# Patient Record
Sex: FEMALE | Race: WHITE | Employment: FULL TIME | URBAN - METROPOLITAN AREA
[De-identification: names, ages, dates, MRNs, and addresses within clinical notes are randomized per-mention and may not be internally consistent; named-entity substitution may affect disease eponyms.]

---

## 2017-02-09 ENCOUNTER — OFFICE VISIT (OUTPATIENT)
Dept: FAMILY MEDICINE | Facility: CLINIC | Age: 42
End: 2017-02-09
Payer: COMMERCIAL

## 2017-02-09 VITALS
TEMPERATURE: 98.3 F | OXYGEN SATURATION: 100 % | HEART RATE: 63 BPM | WEIGHT: 110 LBS | DIASTOLIC BLOOD PRESSURE: 60 MMHG | SYSTOLIC BLOOD PRESSURE: 102 MMHG | HEIGHT: 61 IN | BODY MASS INDEX: 20.77 KG/M2 | RESPIRATION RATE: 14 BRPM

## 2017-02-09 DIAGNOSIS — L03.011 CELLULITIS OF FINGER OF RIGHT HAND: Primary | ICD-10-CM

## 2017-02-09 PROCEDURE — 99213 OFFICE O/P EST LOW 20 MIN: CPT | Performed by: PHYSICIAN ASSISTANT

## 2017-02-09 RX ORDER — SULFAMETHOXAZOLE/TRIMETHOPRIM 800-160 MG
1 TABLET ORAL 2 TIMES DAILY
Qty: 20 TABLET | Refills: 0 | Status: SHIPPED | OUTPATIENT
Start: 2017-02-09 | End: 2021-06-21

## 2017-02-09 NOTE — PROGRESS NOTES
SUBJECTIVE:                                                    Elisa Hawkins is a 41 year old female who presents to clinic today for the following health issues:    Cellulitis      Duration: X2 weeks    Description (location/character/radiation): Right middle finger    Intensity:  moderate    Accompanying signs and symptoms: Red, peeling, painful, drainage    History (similar episodes/previous evaluation): None    Precipitating or alleviating factors: None    Therapies tried and outcome: Antibiotic cream Hydrogen Peroxide     She was trimming her nail and noted increased redness, swelling and has had some purulent drainage. She treated with hydrogen peroxide and antibiotic cream with no improvement. She has not lost her nail.     She is right handed and does type a lot for her job. She has noted pain with that and slowness in typing.     Problem list and histories reviewed & adjusted, as indicated.  Additional history: as documented    Patient Active Problem List   Diagnosis     Hyperlipidemia LDL goal <160     Past Surgical History   Procedure Laterality Date     Ent surgery  1999     wisdom teeth     Eye surgery  2008     lasik        Social History   Substance Use Topics     Smoking status: Never Smoker      Smokeless tobacco: Never Used     Alcohol Use: No     Family History   Problem Relation Age of Onset     Hypertension Mother      Musculoskeletal Disorder Father      gout         Current Outpatient Prescriptions   Medication Sig Dispense Refill     sulfamethoxazole-trimethoprim (BACTRIM DS/SEPTRA DS) 800-160 MG per tablet Take 1 tablet by mouth 2 times daily 20 tablet 0     hydrocortisone (ANUSOL-HC) 2.5 % rectal cream Place rectally 2 times daily 70 g 1     Prenatal Vit-Fe Fumarate-FA (PRENATAL MULTIVITAMIN  PLUS IRON) 27-0.8 MG TABS Take 1 tablet by mouth daily 100 tablet 3     [DISCONTINUED] mometasone (ELOCON) 0.1 % ointment Apply sparingly to affected area twice daily.  Do not apply to face. 15 g 1  "      ROS:  Patient denies fever, chills, nausea, vomiting, diarrhea, abdominal pain, chest pain, shortness of breath, headache, dizziness, lightheadedness. She denies numbness, tingling of the extremity.     OBJECTIVE:                                                    /60 mmHg  Pulse 63  Temp(Src) 98.3  F (36.8  C) (Tympanic)  Resp 14  Ht 5' 1\" (1.549 m)  Wt 110 lb (49.896 kg)  BMI 20.80 kg/m2  SpO2 100%  LMP 01/13/2017  Breastfeeding? No  Body mass index is 20.8 kg/(m^2).  GENERAL APPEARANCE: healthy, alert and no distress  ORTHO: Hand/Finger Exam: Inspection:Long Finger:  Redness and swelling of the distal phalanx with small amount of purulent discharge on the lateral aspect of the nailbed. Nail is intact.   Tender: distal phalanx  Range of Motion full in the right 3rd finger- extensor and flexor tendons are intact  Strength: not tested  Special tests: none  NEURO: SILT over the 3rd finger  VASC: cap refill <2 secs over distal 3rd finger    Diagnostic Test Results:  none      ASSESSMENT/PLAN:                                                        ICD-10-CM    1. Cellulitis of finger of right hand L03.011 sulfamethoxazole-trimethoprim (BACTRIM DS/SEPTRA DS) 800-160 MG per tablet     Discussed worsening symptoms including increased redness, swelling or systemic symptoms. She will notify us if things are not improving as anticipated. I have instructed her to aki tape the finger to reduce movement, pad the finger with guaze to protect it from further injury. This was done at clinic today. She will contact us with questions/concerns.     Nicole Joy Siegler, PA-C  [facility]  "

## 2017-02-09 NOTE — MR AVS SNAPSHOT
"              After Visit Summary   2017    Elisa Hawkins    MRN: [id number]           Patient Information     Date Of Birth          1975        Visit Information        Provider Department      2017 7:30 AM Siegler, Nicole Joy, PA-C [facility]        Today's Diagnoses     Cellulitis of finger of right hand    -  1        Follow-ups after your visit        Who to contact     If you have questions or need follow up information about today's clinic visit or your schedule please contact [facility] directly at [phone].  Normal or non-critical lab and imaging results will be communicated to you by [employer], letter or phone within 4 business days after the clinic has received the results. If you do not hear from us within 7 days, please contact the clinic through [employer] or phone. If you have a critical or abnormal lab result, we will notify you by phone as soon as possible.  Submit refill requests through [employer] or call your pharmacy and they will forward the refill request to us. Please allow 3 business days for your refill to be completed.          Additional Information About Your Visit        MyChart Information     [employer] lets you send messages to your doctor, view your test results, renew your prescriptions, schedule appointments and more. To sign up, go to [email or web address] . Click on \"Log in\" on the left side of the screen, which will take you to the Welcome page. Then click on \"Sign up Now\" on the right side of the page.     You will be asked to enter the access code listed below, as well as some personal information. Please follow the directions to create your username and password.     Your access code is: [email or web address]  Expires: 5/10/2017  7:48 AM     Your access code will  in 90 days. If you need help or a new code, please call your [facility] or [phone].        Care EveryWhere ID     This is your Care " "EveryWhere ID. This could be used by other organizations to access your [place] medical records  [id number]        Your Vitals Were     Pulse Temperature Respirations    63 98.3  F (36.8  C) (Tympanic) 14    Height BMI (Body Mass Index) Pulse Oximetry    5' 1\" (1.549 m) 20.80 kg/m2 100%    Last Period Breastfeeding?       01/13/2017 No        Blood Pressure from Last 3 Encounters:   02/09/17 102/60   10/05/16 113/58   08/26/14 100/60    Weight from Last 3 Encounters:   02/09/17 110 lb (49.896 kg)   08/26/14 127 lb (57.607 kg)   07/29/14 122 lb (55.339 kg)              Today, you had the following     No orders found for display         Today's Medication Changes          These changes are accurate as of: 2/9/17  7:48 AM.  If you have any questions, ask your nurse or doctor.               Start taking these medicines.        Dose/Directions    sulfamethoxazole-trimethoprim 800-160 MG per tablet   Commonly known as:  BACTRIM DS/SEPTRA DS   Used for:  Cellulitis of finger of right hand   Started by:  Siegler, Nicole Joy, PA-C        Dose:  1 tablet   Take 1 tablet by mouth 2 times daily   Quantity:  20 tablet   Refills:  0            Where to get your medicines      These medications were sent to [place] [address] [place] [address]  [address] [facility] [address]     Phone:  [phone]    - sulfamethoxazole-trimethoprim 800-160 MG per tablet             Primary Care Provider    Physician No Ref-Primary       No address on file        Thank you!     Thank you for choosing [facility]  for your care. Our goal is always to provide you with excellent care. Hearing back from our patients is one way we can continue to improve our services. Please take a few minutes to complete the written survey that you may receive in the mail after your visit with us. Thank you!             Your Updated Medication List - Protect others around you: Learn how to safely use, " store and throw away your medicines at www.disposemymeds.org.          This list is accurate as of: 2/9/17  7:48 AM.  Always use your most recent med list.                   Brand Name Dispense Instructions for use    hydrocortisone 2.5 % cream    ANUSOL-HC    70 g    Place rectally 2 times daily       prenatal multivitamin  plus iron 27-0.8 MG Tabs per tablet     100 tablet    Take 1 tablet by mouth daily       sulfamethoxazole-trimethoprim 800-160 MG per tablet    BACTRIM DS/SEPTRA DS    20 tablet    Take 1 tablet by mouth 2 times daily

## 2017-02-09 NOTE — NURSING NOTE
"Chief Complaint   Patient presents with     Cellulitis     Right middle finger       Initial /60 mmHg  Pulse 63  Temp(Src) 98.3  F (36.8  C) (Tympanic)  Resp 14  Ht 5' 1\" (1.549 m)  Wt 110 lb (49.896 kg)  BMI 20.80 kg/m2  SpO2 100%  LMP 01/13/2017  Breastfeeding? No Estimated body mass index is 20.8 kg/(m^2) as calculated from the following:    Height as of this encounter: 5' 1\" (1.549 m).    Weight as of this encounter: 110 lb (49.896 kg).  Medication Reconciliation: complete     Aide Mendiola LPN  "

## 2021-06-10 ENCOUNTER — OFFICE VISIT (OUTPATIENT)
Dept: URGENT CARE | Facility: URGENT CARE | Age: 46
End: 2021-06-10
Payer: COMMERCIAL

## 2021-06-10 VITALS
BODY MASS INDEX: 19.84 KG/M2 | SYSTOLIC BLOOD PRESSURE: 110 MMHG | TEMPERATURE: 97.8 F | DIASTOLIC BLOOD PRESSURE: 80 MMHG | HEART RATE: 65 BPM | WEIGHT: 105 LBS | OXYGEN SATURATION: 96 %

## 2021-06-10 DIAGNOSIS — L50.9 HIVES: Primary | ICD-10-CM

## 2021-06-10 PROCEDURE — 99203 OFFICE O/P NEW LOW 30 MIN: CPT | Performed by: PHYSICIAN ASSISTANT

## 2021-06-10 RX ORDER — DIPHENHYDRAMINE HCL 25 MG
25 TABLET ORAL EVERY 6 HOURS PRN
COMMUNITY

## 2021-06-10 RX ORDER — CETIRIZINE HYDROCHLORIDE 10 MG/1
10 TABLET ORAL DAILY
Qty: 14 TABLET | Refills: 0 | Status: SHIPPED | OUTPATIENT
Start: 2021-06-10

## 2021-06-10 RX ORDER — PREDNISONE 20 MG/1
TABLET ORAL
Qty: 20 TABLET | Refills: 0 | Status: SHIPPED | OUTPATIENT
Start: 2021-06-10 | End: 2021-06-21

## 2021-06-10 ASSESSMENT — ENCOUNTER SYMPTOMS: SHORTNESS OF BREATH: 0

## 2021-06-10 NOTE — PROGRESS NOTES
SUBJECTIVE:   Elisa Hawkins is a 45 year old female presenting with a chief complaint of   Chief Complaint   Patient presents with     Urgent Care     Derm Problem     hives all over, itchy and burning, started last night.       She is a new patient of [facility].  Patient presents with complaints of hives all over body since last night, following a shower.  Patient denies any nuts or seafood.  No lips or tongue involvement.  Patient took one benadryl an hour ago.      Review of Systems   Respiratory: Negative for shortness of breath.    Skin: Positive for rash.   All other systems reviewed and are negative.      Past Medical History:   Diagnosis Date     Dermatitis      Family History   Problem Relation Age of Onset     Hypertension Mother      Musculoskeletal Disorder Father         gout     Current Outpatient Medications   Medication Sig Dispense Refill     cetirizine (ZYRTEC) 10 MG tablet Take 1 tablet (10 mg) by mouth daily 14 tablet 0     diphenhydrAMINE (BENADRYL) 25 MG tablet Take 25 mg by mouth every 6 hours as needed for itching or allergies       predniSONE (DELTASONE) 20 MG tablet Take 3 tabs by mouth daily x 3 days, then 2 tabs daily x 3 days, then 1 tab daily x 3 days, then 1/2 tab daily x 3 days. 20 tablet 0     hydrocortisone (ANUSOL-HC) 2.5 % rectal cream Place rectally 2 times daily (Patient not taking: Reported on 6/10/2021) 70 g 1     Prenatal Vit-Fe Fumarate-FA (PRENATAL MULTIVITAMIN  PLUS IRON) 27-0.8 MG TABS Take 1 tablet by mouth daily 100 tablet 3     sulfamethoxazole-trimethoprim (BACTRIM DS/SEPTRA DS) 800-160 MG per tablet Take 1 tablet by mouth 2 times daily (Patient not taking: Reported on 6/10/2021) 20 tablet 0     Social History     Tobacco Use     Smoking status: Never Smoker     Smokeless tobacco: Never Used   Substance Use Topics     Alcohol use: No       OBJECTIVE  /80   Pulse 65   Temp 97.8  F (36.6  C) (Tympanic)   Wt 47.6 kg (105 lb)   SpO2 96%   BMI 19.84 kg/m       Physical Exam  Vitals signs and nursing note reviewed.   Constitutional:       Appearance: Normal appearance. She is normal weight.   HENT:      Mouth/Throat:      Mouth: Mucous membranes are moist.      Pharynx: Oropharynx is clear.   Eyes:      Extraocular Movements: Extraocular movements intact.      Conjunctiva/sclera: Conjunctivae normal.   Cardiovascular:      Rate and Rhythm: Normal rate and regular rhythm.      Pulses: Normal pulses.      Heart sounds: Normal heart sounds.   Pulmonary:      Effort: Pulmonary effort is normal.      Breath sounds: Normal breath sounds. No wheezing.   Skin:     Comments: Hives of various sizes to chest, abdomen, arms, neck, upper legs.   Neurological:      General: No focal deficit present.      Mental Status: She is alert.   Psychiatric:         Mood and Affect: Mood normal.         Labs:  No results found for this or any previous visit (from the past 24 hour(s)).    X-Ray was not done.    ASSESSMENT:      ICD-10-CM    1. Hives  L50.9 predniSONE (DELTASONE) 20 MG tablet     cetirizine (ZYRTEC) 10 MG tablet     ALLERGY/ASTHMA ADULT REFERRAL        Medical Decision Making:    Differential Diagnosis:    Serious Comorbid Conditions:  Adult:  None    PLAN:    Rx for zyrtec, prednisone and referral to allergist.  Discussed reasons to seek immediate medical attention.        Followup:    If not improving or if condition worsens, follow up with your Primary Care Provider, If not improving or if conditions worsens over the next 12-24 hours, go to the Emergency Department    Patient Instructions     Patient Education     General Allergic Reactions  An allergic reaction is a set of symptoms caused by an allergen. An allergen is something that causes your immune system to react abnormally. It releases various chemicals. These include histamine. Histamine causes swelling and itching. An allergic reaction may affect the entire body. This is called a general allergic reaction. Often  symptoms affect only one part of the body. This is called a local allergic reaction.   You are having an allergic reaction. Almost anything can cause one. Different people are allergic to different things. It is usually something that you ate or swallowed, came into contact with by getting or putting it on your skin or clothes, or something you breathed in the air. This can be very annoying and sometimes scary.   Most people think of allergic reactions when they have a rash or itchy skin. Other symptoms can include:     Itching of the eyes, nose, and roof of the mouth    Runny or stuffy nose    Watery eyes     Sneezing or coughing     A blocked feeling in the ear    Red, raised, itchy rash called hives    Red and purple spots    Rash, redness, welts, blisters    Itching, burning, stinging, pain    Dry, flaky, cracking, scaly skin  Severe symptoms include:    Swelling of the face, lips, or other parts of the body    Hoarse voice    Trouble swallowing, feeling like your throat is closing    Trouble breathing, wheezing    Nausea, vomiting, diarrhea, stomach cramps    Feeling faint or lightheaded, rapid heart rate  Sometimes the cause may be obvious. But there are so many things that can cause a reaction that you may not be able to figure it out. The most important things to help find your allergen are to remember:     When it started    What you were doing at the time or just before that    What activities you were involved in    If you were exposed to anything new  Below are some common causes of allergies. Some of these can cause severe general allergic reactions. Others can cause mild to moderate symptoms. But remember that almost anything can cause a reaction. You may not even be aware that you came into contact with one of these things:     Dust, mold, pollen    Plants (common ones are poison ivy and poison oak, but there are many others)     Animals    Foods such as shrimp, shellfish, peanuts, milk products,  gluten, and eggs    Food colorings, flavorings, and additives    Insect bites or stings such as bees, mosquitoes, fleas, and ticks    Medicines such as penicillin, sulfa medicines, aspirin, and ibuprofen. But any medicine can cause a reaction.    Jewelry such as nickel or gold. This can be new, or something you ve worn for a while, including zippers and buttons.    Latex such as in gloves, clothes, toys, balloons, or some tapes. Some people allergic to latex may also have problems with foods like bananas, avocados, kiwi, papaya, or chestnuts.    Lotions, perfumes, cosmetics, soaps, shampoos, skincare products, nail products    Chemicals or dyes in clothing, linen, , hair dyes, soaps, iodine  Many viruses and common colds can cause a rash that is not an allergic reaction. Sometimes it is hard to tell the difference between allergies, sensitivity, or an intolerance to something. This is especially true with food. Many things can cause diarrhea, vomiting, stomach cramps, and skin irritation.   Home care    The goal of treatment is to help relieve the symptoms and get you feeling better. The rash will usually fade over several days. But it can sometimes last a couple of weeks. Over the next couple of days, there may be times when it gets a little worse, and then better again. Here are some things to do:     If you know what you are allergic to, stay away from it. Future exposures may cause similar or sometimes worse symptoms.    Don't wear tight clothing and stay away from anything that heats up your skin such as hot showers or baths, and direct sunlight. Heat will make itching worse.    An ice pack will relieve local areas of intense itching and redness. To make an ice pack, put ice cubes in a plastic bag that seals at the top. Wrap it in a thin, clean towel. Don t put the ice directly on the skin because it can damage the skin.    Oral diphenhydramine is an over-the-counter antihistamine sold at pharmacies and  grocery stores. Unless a prescription antihistamine was given, diphenhydramine may be used to reduce itching if large areas of the skin are involved. It may make you sleepy. So be careful using it in the daytime or when going to school, working, or driving. Note: Don t use diphenhydramine if you have glaucoma or if you are a man with trouble urinating because of an enlarged prostate. There are other antihistamines that won t make you so sleepy. These are good choices for daytime use. Ask your healthcare provider or pharmacist for suggestions.    Don t use diphenhydramine cream on your skin unless prescribed. It may cause a worse reaction in some people.    To help prevent an infection, don't scratch the affected area. Scratching may worsen the reaction and damage your skin. It can also lead to an infection. Always check the affected areas for signs of an infection.    Call your healthcare provider and ask what you can use to help decrease the itching.    To decrease your exposure to allergens, try the following:    ? Use heat-steam to clean your home.  ? Use high-efficiency particulate (HEPA) vacuums and filters.  ? Stay away from food and pet triggers.  ? Kill any cockroaches and use pest control to keep further infestations from happening.  ? Clean your house often.  Follow-up care  Follow up with your healthcare provider, or as advised. If you had a severe reaction today, or if you have had several mild to medium allergic reactions in the past, ask your provider about allergy testing. This can help you find out what you are allergic to. If you had a severe reaction that included dizziness, fainting, or trouble breathing or swallowing, ask your provider about carrying auto-injectable epinephrine.   Call 911  Call 911 if any of these occur:     Trouble breathing or swallowing, wheezing    Cool, moist, pale skin    Shortness of breath    Hoarse voice or trouble speaking    Confusion     Very drowsy or trouble  awakening    Fainting or loss of consciousness    Rapid heart rate    Feeling of dizziness or weakness or a sudden drop in blood pressure    Feeling of doom    Feeling lightheaded    Severe nausea or vomiting, or diarrhea    Seizure    Swelling in the face, eyelids, lips, mouth, throat, or tongue    Drooling  When to seek medical advice  Call your healthcare provider or get medical care right away if any of these occur:     Spreading areas of itching, redness, or swelling    Nausea or stomach cramps or abdominal pain    Continuing or recurring symptoms    Spreading areas of redness, swelling, or itching    Signs of infection at the affected site:  ? Spreading redness  ? Increased pain or swelling  ? Fluid or colored drainage from the site  ? Fever of 100.4 F (38 C) or above lasting for 24 to 48 hours, or as directed by your provider  Danial last reviewed this educational content on 10/1/2019    2086-4729 [employer]. All rights reserved. This information is not intended as a substitute for professional medical care. Always follow your healthcare professional's instructions.

## 2021-06-10 NOTE — LETTER
[facility] [place]  [address]  [place] [address]  Phone: [phone]  Fax: [phone]    Tosha 10, 2021        Elisa Hawkins  [address] [address]          To whom it may concern:    RE: Elisa Hawkins    Patient was seen and treated today at our clinic.  She may return to work on 6/12/2021.    Please contact me for questions or concerns.      Sincerely,        Lily Go

## 2021-06-11 NOTE — PATIENT INSTRUCTIONS
Patient Education     General Allergic Reactions  An allergic reaction is a set of symptoms caused by an allergen. An allergen is something that causes your immune system to react abnormally. It releases various chemicals. These include histamine. Histamine causes swelling and itching. An allergic reaction may affect the entire body. This is called a general allergic reaction. Often symptoms affect only one part of the body. This is called a local allergic reaction.   You are having an allergic reaction. Almost anything can cause one. Different people are allergic to different things. It is usually something that you ate or swallowed, came into contact with by getting or putting it on your skin or clothes, or something you breathed in the air. This can be very annoying and sometimes scary.   Most people think of allergic reactions when they have a rash or itchy skin. Other symptoms can include:     Itching of the eyes, nose, and roof of the mouth    Runny or stuffy nose    Watery eyes     Sneezing or coughing     A blocked feeling in the ear    Red, raised, itchy rash called hives    Red and purple spots    Rash, redness, welts, blisters    Itching, burning, stinging, pain    Dry, flaky, cracking, scaly skin  Severe symptoms include:    Swelling of the face, lips, or other parts of the body    Hoarse voice    Trouble swallowing, feeling like your throat is closing    Trouble breathing, wheezing    Nausea, vomiting, diarrhea, stomach cramps    Feeling faint or lightheaded, rapid heart rate  Sometimes the cause may be obvious. But there are so many things that can cause a reaction that you may not be able to figure it out. The most important things to help find your allergen are to remember:     When it started    What you were doing at the time or just before that    What activities you were involved in    If you were exposed to anything new  Below are some common causes of allergies. Some of these can cause severe  general allergic reactions. Others can cause mild to moderate symptoms. But remember that almost anything can cause a reaction. You may not even be aware that you came into contact with one of these things:     Dust, mold, pollen    Plants (common ones are poison ivy and poison oak, but there are many others)     Animals    Foods such as shrimp, shellfish, peanuts, milk products, gluten, and eggs    Food colorings, flavorings, and additives    Insect bites or stings such as bees, mosquitoes, fleas, and ticks    Medicines such as penicillin, sulfa medicines, aspirin, and ibuprofen. But any medicine can cause a reaction.    Jewelry such as nickel or gold. This can be new, or something you ve worn for a while, including zippers and buttons.    Latex such as in gloves, clothes, toys, balloons, or some tapes. Some people allergic to latex may also have problems with foods like bananas, avocados, kiwi, papaya, or chestnuts.    Lotions, perfumes, cosmetics, soaps, shampoos, skincare products, nail products    Chemicals or dyes in clothing, linen, , hair dyes, soaps, iodine  Many viruses and common colds can cause a rash that is not an allergic reaction. Sometimes it is hard to tell the difference between allergies, sensitivity, or an intolerance to something. This is especially true with food. Many things can cause diarrhea, vomiting, stomach cramps, and skin irritation.   Home care    The goal of treatment is to help relieve the symptoms and get you feeling better. The rash will usually fade over several days. But it can sometimes last a couple of weeks. Over the next couple of days, there may be times when it gets a little worse, and then better again. Here are some things to do:     If you know what you are allergic to, stay away from it. Future exposures may cause similar or sometimes worse symptoms.    Don't wear tight clothing and stay away from anything that heats up your skin such as hot showers or baths,  and direct sunlight. Heat will make itching worse.    An ice pack will relieve local areas of intense itching and redness. To make an ice pack, put ice cubes in a plastic bag that seals at the top. Wrap it in a thin, clean towel. Don t put the ice directly on the skin because it can damage the skin.    Oral diphenhydramine is an over-the-counter antihistamine sold at pharmacies and grocery stores. Unless a prescription antihistamine was given, diphenhydramine may be used to reduce itching if large areas of the skin are involved. It may make you sleepy. So be careful using it in the daytime or when going to school, working, or driving. Note: Don t use diphenhydramine if you have glaucoma or if you are a man with trouble urinating because of an enlarged prostate. There are other antihistamines that won t make you so sleepy. These are good choices for daytime use. Ask your healthcare provider or pharmacist for suggestions.    Don t use diphenhydramine cream on your skin unless prescribed. It may cause a worse reaction in some people.    To help prevent an infection, don't scratch the affected area. Scratching may worsen the reaction and damage your skin. It can also lead to an infection. Always check the affected areas for signs of an infection.    Call your healthcare provider and ask what you can use to help decrease the itching.    To decrease your exposure to allergens, try the following:    ? Use heat-steam to clean your home.  ? Use high-efficiency particulate (HEPA) vacuums and filters.  ? Stay away from food and pet triggers.  ? Kill any cockroaches and use pest control to keep further infestations from happening.  ? Clean your house often.  Follow-up care  Follow up with your healthcare provider, or as advised. If you had a severe reaction today, or if you have had several mild to medium allergic reactions in the past, ask your provider about allergy testing. This can help you find out what you are allergic  to. If you had a severe reaction that included dizziness, fainting, or trouble breathing or swallowing, ask your provider about carrying auto-injectable epinephrine.   Call 911  Call 911 if any of these occur:     Trouble breathing or swallowing, wheezing    Cool, moist, pale skin    Shortness of breath    Hoarse voice or trouble speaking    Confusion     Very drowsy or trouble awakening    Fainting or loss of consciousness    Rapid heart rate    Feeling of dizziness or weakness or a sudden drop in blood pressure    Feeling of doom    Feeling lightheaded    Severe nausea or vomiting, or diarrhea    Seizure    Swelling in the face, eyelids, lips, mouth, throat, or tongue    Drooling  When to seek medical advice  Call your healthcare provider or get medical care right away if any of these occur:     Spreading areas of itching, redness, or swelling    Nausea or stomach cramps or abdominal pain    Continuing or recurring symptoms    Spreading areas of redness, swelling, or itching    Signs of infection at the affected site:  ? Spreading redness  ? Increased pain or swelling  ? Fluid or colored drainage from the site  ? Fever of 100.4 F (38 C) or above lasting for 24 to 48 hours, or as directed by your provider  Dan[employer] last reviewed this educational content on 10/1/2019    2000-1015 [employer]. All rights reserved. This information is not intended as a substitute for professional medical care. Always follow your healthcare professional's instructions.

## 2021-06-21 ENCOUNTER — OFFICE VISIT (OUTPATIENT)
Dept: FAMILY MEDICINE | Facility: CLINIC | Age: 46
End: 2021-06-21
Payer: COMMERCIAL

## 2021-06-21 VITALS
DIASTOLIC BLOOD PRESSURE: 74 MMHG | OXYGEN SATURATION: 98 % | WEIGHT: 106.1 LBS | TEMPERATURE: 97.3 F | HEART RATE: 73 BPM | SYSTOLIC BLOOD PRESSURE: 112 MMHG | HEIGHT: 61 IN | BODY MASS INDEX: 20.03 KG/M2

## 2021-06-21 DIAGNOSIS — H61.21 IMPACTED CERUMEN OF RIGHT EAR: Primary | ICD-10-CM

## 2021-06-21 PROCEDURE — 99207 PR DROP WITH A PROCEDURE: CPT | Performed by: NURSE PRACTITIONER

## 2021-06-21 PROCEDURE — 69209 REMOVE IMPACTED EAR WAX UNI: CPT | Mod: RT | Performed by: NURSE PRACTITIONER

## 2021-06-21 ASSESSMENT — MIFFLIN-ST. JEOR: SCORE: 1073.65

## 2021-06-21 NOTE — PROGRESS NOTES
"    Assessment & Plan   Problem List Items Addressed This Visit     None      Visit Diagnoses     Impacted cerumen of right ear    -  Primary    Relevant Orders    LA REMOVAL IMPACTED CERUMEN IRRIGATION/LVG UNILAT (Completed)         CMA completed cerumen removal by lavage     ERIKA Covarrubias CNP  [facility] KALLIE Pérez is a 45 year old who presents for the following health issues     HPI   Right ear pain, mild on occasion x 5 days  Feels like right ear is plugged and she can't hear       Review of Systems   Detailed as above       Objective    /74 (BP Location: Left arm, Cuff Size: Adult Regular)   Pulse 73   Temp 97.3  F (36.3  C) (Oral)   Ht 1.549 m (5' 1\")   Wt 48.1 kg (106 lb 1.6 oz)   LMP 06/01/2021 (Exact Date)   SpO2 98%   BMI 20.05 kg/m    There is no height or weight on file to calculate BMI.  Physical Exam   NAD  R cerumen impaction  L TM and canal normal           "

## 2021-06-21 NOTE — NURSING NOTE
Patient identified using two patient identifiers.  Ear exam showing wax occlusion completed by provider.  H202/H20 was placed in the right ear(s) via irrigation tool: elephant marlon Hdez MA  .